# Patient Record
Sex: FEMALE | Race: WHITE | URBAN - METROPOLITAN AREA
[De-identification: names, ages, dates, MRNs, and addresses within clinical notes are randomized per-mention and may not be internally consistent; named-entity substitution may affect disease eponyms.]

---

## 2024-04-29 ENCOUNTER — TELEPHONE (OUTPATIENT)
Dept: FAMILY MEDICINE CLINIC | Age: 54
End: 2024-04-29

## 2024-04-29 NOTE — TELEPHONE ENCOUNTER
Pt called [facility] to see if she could get a note for jury summons due to having to stay home and take care of her mother, Josseline.     Pt has not been seen in office since 2014 and will need to be seen as a new patient in office in order for Dr to consider letter.

## 2024-05-02 NOTE — TELEPHONE ENCOUNTER
Called pt. Informed her that since she hasn't been seen in office since 2014, that we would need to see her as a pt before Dr Melo will send a letter.     Pt states that the jurphan summons excuse is due to her mother, Josseline Durán.    Informed pt that her mother also has not been seen in office since 2020, so Dr Melo would not give a note. (Bebe is on Josseline's HIPAA forms)     Pt states that she will think about coming in for an appointment and will call back if she decided to schedule.